# Patient Record
Sex: FEMALE | Race: WHITE
[De-identification: names, ages, dates, MRNs, and addresses within clinical notes are randomized per-mention and may not be internally consistent; named-entity substitution may affect disease eponyms.]

---

## 2018-02-27 ENCOUNTER — HOSPITAL ENCOUNTER (OUTPATIENT)
Dept: HOSPITAL 76 - LAB | Age: 28
Discharge: HOME | End: 2018-02-27
Attending: OBSTETRICS & GYNECOLOGY
Payer: COMMERCIAL

## 2018-02-27 DIAGNOSIS — Z36.9: Primary | ICD-10-CM

## 2018-02-27 LAB
BASOPHILS NFR BLD AUTO: 0.1 10^3/UL (ref 0–0.1)
BASOPHILS NFR BLD AUTO: 0.5 %
CLARITY UR REFRACT.AUTO: CLEAR
EOSINOPHIL # BLD AUTO: 0.2 10^3/UL (ref 0–0.7)
EOSINOPHIL NFR BLD AUTO: 1.5 %
ERYTHROCYTE [DISTWIDTH] IN BLOOD BY AUTOMATED COUNT: 14.5 % (ref 12–15)
GLUCOSE UR QL STRIP.AUTO: NEGATIVE MG/DL
HGB UR QL STRIP: 12.7 G/DL (ref 12–16)
KETONES UR QL STRIP.AUTO: NEGATIVE MG/DL
LYMPHOCYTES # SPEC AUTO: 1.5 10^3/UL (ref 1.5–3.5)
LYMPHOCYTES NFR BLD AUTO: 14.6 %
MCH RBC QN AUTO: 32.6 PG (ref 27–31)
MCHC RBC AUTO-ENTMCNC: 35.2 G/DL (ref 32–36)
MCV RBC AUTO: 92.6 FL (ref 81–99)
MONOCYTES # BLD AUTO: 0.6 10^3/UL (ref 0–1)
MONOCYTES NFR BLD AUTO: 6.1 %
NEUTROPHILS # BLD AUTO: 8.1 10^3/UL (ref 1.5–6.6)
NEUTROPHILS # SNV AUTO: 10.5 X10^3/UL (ref 4.8–10.8)
NEUTROPHILS NFR BLD AUTO: 77.3 %
NITRITE UR QL STRIP.AUTO: NEGATIVE
PDW BLD AUTO: 7.7 FL (ref 7.9–10.8)
PH UR STRIP.AUTO: 8 PH (ref 5–7.5)
PLATELET # BLD: 279 10^3/UL (ref 130–450)
PROT UR STRIP.AUTO-MCNC: NEGATIVE MG/DL
RBC # UR STRIP.AUTO: NEGATIVE /UL
RBC # URNS HPF: (no result) /HPF (ref 0–5)
RBC MAR: 3.91 10^6/UL (ref 4.2–5.4)
SP GR UR STRIP.AUTO: 1.01 (ref 1–1.03)
SQUAMOUS URNS QL MICRO: (no result)
UROBILINOGEN UR QL STRIP.AUTO: (no result) E.U./DL
UROBILINOGEN UR STRIP.AUTO-MCNC: NEGATIVE MG/DL

## 2018-02-27 PROCEDURE — 86803 HEPATITIS C AB TEST: CPT

## 2018-02-27 PROCEDURE — 87340 HEPATITIS B SURFACE AG IA: CPT

## 2018-02-27 PROCEDURE — 87389 HIV-1 AG W/HIV-1&-2 AB AG IA: CPT

## 2018-02-27 PROCEDURE — 86901 BLOOD TYPING SEROLOGIC RH(D): CPT

## 2018-02-27 PROCEDURE — 81001 URINALYSIS AUTO W/SCOPE: CPT

## 2018-02-27 PROCEDURE — 85025 COMPLETE CBC W/AUTO DIFF WBC: CPT

## 2018-02-27 PROCEDURE — 86850 RBC ANTIBODY SCREEN: CPT

## 2018-02-27 PROCEDURE — 36415 COLL VENOUS BLD VENIPUNCTURE: CPT

## 2018-02-27 PROCEDURE — 86900 BLOOD TYPING SEROLOGIC ABO: CPT

## 2018-02-27 PROCEDURE — 86762 RUBELLA ANTIBODY: CPT

## 2018-02-27 PROCEDURE — 86592 SYPHILIS TEST NON-TREP QUAL: CPT

## 2018-02-27 PROCEDURE — 81599 UNLISTED MAAA: CPT

## 2018-02-28 LAB
HEPATITIS B SURFACE ANTIGEN: (no result)
HEPATITIS C ANTIBODY: (no result)
HIV AG/AB 4TH GEN: (no result)
SIGNAL TO CUT-OFF: 0 (ref ?–1)

## 2018-03-01 ENCOUNTER — HOSPITAL ENCOUNTER (OUTPATIENT)
Dept: HOSPITAL 76 - LAB.R | Age: 28
Discharge: HOME | End: 2018-03-01
Attending: OBSTETRICS & GYNECOLOGY
Payer: COMMERCIAL

## 2018-03-01 DIAGNOSIS — B37.3: Primary | ICD-10-CM

## 2018-03-01 PROCEDURE — 87660 TRICHOMONAS VAGIN DIR PROBE: CPT

## 2018-03-01 PROCEDURE — 87510 GARDNER VAG DNA DIR PROBE: CPT

## 2018-03-01 PROCEDURE — 87480 CANDIDA DNA DIR PROBE: CPT

## 2018-04-16 ENCOUNTER — HOSPITAL ENCOUNTER (OUTPATIENT)
Dept: HOSPITAL 76 - DI | Age: 28
Discharge: HOME | End: 2018-04-16
Attending: OBSTETRICS & GYNECOLOGY
Payer: COMMERCIAL

## 2018-04-16 DIAGNOSIS — Z36.9: Primary | ICD-10-CM

## 2018-04-16 PROCEDURE — 76811 OB US DETAILED SNGL FETUS: CPT

## 2018-04-16 NOTE — ULTRASOUND REPORT
OB ULTRASOUND:  04/16/2018

 

CLINICAL INDICATION:  Fetal anatomy.

 

TECHNIQUE:  Real-time scanning was performed with representative static images 
obtained. 







LAST MENSTRUAL PERIOD:  11/27/2017

 

Clinical Age:  20 weeks 0 days

 

US Age:  20 weeks 4 days

 

EFW Hadlock:  383 grams

 

EFW% Hadlock:  88%

 

Fetal Heart Rate:  150 bpm

 

EDC:  09/03/2018

 

US EDC:  08/30/2018

 

BPD Hadlock:  19 weeks 6 days; Mean mm 46

 

HC Hadlock:  20 weeks 4 days; Mean mm 182

 

AC Hadlock:  20 weeks 5 days; Mean mm 156

 

FL Hadlock:  21 weeks 1 day; Mean mm 35

 

Fetal Presentation: breech

 

Placental Location: posterior

 

Cervical Length: TA 4.8 cm

 

Amniotic Fluid: subjectively normal; MVP 4.8 cm





FINDINGS

There is a single viable intrauterine gestation, in breech presentation.  

Fetal heart rate is 150 BPM.  The placenta is posterior, without evidence of 
previa.

Amniotic fluid volume is subjectively normal, with a deepest pocket of 4.8 cm.

By size, the fetus measures 20 weeks 4 days (20 weeks 0 days by LMP).

 

The following anatomic structures were visualized and appear normal:

The intracranial contents, including the ventricles and posterior fossa; the 
fetal lips and orbits; 

the fetal spine; the fetal heart, including 4 chamber view and outflow tracts, 
and fetal diaphragm; 

the fetal abdominal contents, including the stomach, the bilateral kidneys, and 
urinary bladder, as well as 

a normal 3 vessel cord insertion; 4 fetal limbs.

 

No free fluid or adnexal lesion is appreciated.

 

IMPRESSION:  SINGLE VIABLE INTRAUTERINE GESTATION, WITH SIZE IN KEEPING WITH 
LMP DATING.  

NORMAL FETAL ANATOMIC SURVEY.

 

 

 

DD: 04/16/2018 14:23

TD: 04/16/2018 14:45

Job #: [id number]

[facility]

## 2018-05-17 ENCOUNTER — HOSPITAL ENCOUNTER (OUTPATIENT)
Dept: HOSPITAL 76 - LAB | Age: 28
Discharge: HOME | End: 2018-05-17
Attending: OBSTETRICS & GYNECOLOGY
Payer: COMMERCIAL

## 2018-05-17 DIAGNOSIS — Z34.90: Primary | ICD-10-CM

## 2018-05-17 LAB
BASOPHILS NFR BLD AUTO: 0 10^3/UL (ref 0–0.1)
BASOPHILS NFR BLD AUTO: 0.3 %
EOSINOPHIL # BLD AUTO: 0.1 10^3/UL (ref 0–0.7)
EOSINOPHIL NFR BLD AUTO: 0.9 %
ERYTHROCYTE [DISTWIDTH] IN BLOOD BY AUTOMATED COUNT: 14.3 % (ref 12–15)
HGB UR QL STRIP: 12.1 G/DL (ref 12–16)
LYMPHOCYTES # SPEC AUTO: 1.4 10^3/UL (ref 1.5–3.5)
LYMPHOCYTES NFR BLD AUTO: 13.8 %
MCH RBC QN AUTO: 32.8 PG (ref 27–31)
MCHC RBC AUTO-ENTMCNC: 34.9 G/DL (ref 32–36)
MCV RBC AUTO: 93.8 FL (ref 81–99)
MONOCYTES # BLD AUTO: 0.5 10^3/UL (ref 0–1)
MONOCYTES NFR BLD AUTO: 5.1 %
NEUTROPHILS # BLD AUTO: 8.2 10^3/UL (ref 1.5–6.6)
NEUTROPHILS # SNV AUTO: 10.3 X10^3/UL (ref 4.8–10.8)
NEUTROPHILS NFR BLD AUTO: 79.9 %
PDW BLD AUTO: 7.4 FL (ref 7.9–10.8)
PLATELET # BLD: 223 10^3/UL (ref 130–450)
RBC MAR: 3.7 10^6/UL (ref 4.2–5.4)

## 2018-05-17 PROCEDURE — 86850 RBC ANTIBODY SCREEN: CPT

## 2018-05-17 PROCEDURE — 82950 GLUCOSE TEST: CPT

## 2018-05-17 PROCEDURE — 36415 COLL VENOUS BLD VENIPUNCTURE: CPT

## 2018-05-17 PROCEDURE — 85025 COMPLETE CBC W/AUTO DIFF WBC: CPT

## 2018-06-01 ENCOUNTER — HOSPITAL ENCOUNTER (OUTPATIENT)
Dept: HOSPITAL 76 - LAB | Age: 28
Discharge: HOME | End: 2018-06-01
Attending: OBSTETRICS & GYNECOLOGY
Payer: COMMERCIAL

## 2018-06-01 DIAGNOSIS — Z36.9: Primary | ICD-10-CM

## 2018-06-01 PROCEDURE — 82951 GLUCOSE TOLERANCE TEST (GTT): CPT

## 2018-06-01 PROCEDURE — 36415 COLL VENOUS BLD VENIPUNCTURE: CPT

## 2018-06-01 PROCEDURE — 82952 GTT-ADDED SAMPLES: CPT

## 2018-08-09 ENCOUNTER — HOSPITAL ENCOUNTER (OUTPATIENT)
Dept: HOSPITAL 76 - LAB.R | Age: 28
Discharge: HOME | End: 2018-08-09
Attending: OBSTETRICS & GYNECOLOGY
Payer: COMMERCIAL

## 2018-08-09 DIAGNOSIS — Z36.85: Primary | ICD-10-CM

## 2018-08-09 PROCEDURE — 87081 CULTURE SCREEN ONLY: CPT

## 2018-09-05 ENCOUNTER — HOSPITAL ENCOUNTER (INPATIENT)
Dept: HOSPITAL 76 - WFO | Age: 28
LOS: 2 days | Discharge: HOME | End: 2018-09-07
Attending: OBSTETRICS & GYNECOLOGY | Admitting: OBSTETRICS & GYNECOLOGY
Payer: COMMERCIAL

## 2018-09-05 DIAGNOSIS — Z3A.40: ICD-10-CM

## 2018-09-05 LAB
BASOPHILS NFR BLD AUTO: 0 10^3/UL (ref 0–0.1)
BASOPHILS NFR BLD AUTO: 0.3 %
EOSINOPHIL # BLD AUTO: 0.1 10^3/UL (ref 0–0.7)
EOSINOPHIL NFR BLD AUTO: 0.9 %
ERYTHROCYTE [DISTWIDTH] IN BLOOD BY AUTOMATED COUNT: 14.6 % (ref 12–15)
HGB UR QL STRIP: 12.6 G/DL (ref 12–16)
LYMPHOCYTES # SPEC AUTO: 2 10^3/UL (ref 1.5–3.5)
LYMPHOCYTES NFR BLD AUTO: 14.3 %
MCH RBC QN AUTO: 31.9 PG (ref 27–31)
MCHC RBC AUTO-ENTMCNC: 34.8 G/DL (ref 32–36)
MCV RBC AUTO: 91.8 FL (ref 81–99)
MONOCYTES # BLD AUTO: 0.8 10^3/UL (ref 0–1)
MONOCYTES NFR BLD AUTO: 5.9 %
NEUTROPHILS # BLD AUTO: 11.1 10^3/UL (ref 1.5–6.6)
NEUTROPHILS # SNV AUTO: 14.1 X10^3/UL (ref 4.8–10.8)
NEUTROPHILS NFR BLD AUTO: 78.6 %
PDW BLD AUTO: 7.8 FL (ref 7.9–10.8)
PLATELET # BLD: 222 10^3/UL (ref 130–450)
RBC MAR: 3.93 10^6/UL (ref 4.2–5.4)

## 2018-09-05 PROCEDURE — 99213 OFFICE O/P EST LOW 20 MIN: CPT

## 2018-09-05 PROCEDURE — 85025 COMPLETE CBC W/AUTO DIFF WBC: CPT

## 2018-09-05 RX ADMIN — DOCUSATE SODIUM SCH MG: 100 CAPSULE, LIQUID FILLED ORAL at 22:04

## 2018-09-05 RX ADMIN — SODIUM CHLORIDE, PRESERVATIVE FREE SCH ML: 5 INJECTION INTRAVENOUS at 09:37

## 2018-09-05 RX ADMIN — SODIUM CHLORIDE, PRESERVATIVE FREE SCH ML: 5 INJECTION INTRAVENOUS at 14:12

## 2018-09-05 RX ADMIN — PENICILLIN G POTASSIUM SCH MLS/HR: 5000000 POWDER, FOR SOLUTION INTRAMUSCULAR; INTRAPLEURAL; INTRATHECAL; INTRAVENOUS at 14:13

## 2018-09-05 RX ADMIN — PENICILLIN G POTASSIUM SCH MLS/HR: 5000000 POWDER, FOR SOLUTION INTRAMUSCULAR; INTRAPLEURAL; INTRATHECAL; INTRAVENOUS at 09:37

## 2018-09-05 RX ADMIN — IBUPROFEN SCH MG: 600 TABLET, FILM COATED ORAL at 20:53

## 2018-09-05 NOTE — PROVIDER PROGRESS NOTE
Objective





- Vital Signs/Intake & Output


Vital Signs: 


 Vital Signs x48h











  Temp Pulse Resp BP Pulse Ox


 


 18 02:29  98.6 F  88  18  122/74  100











Intake & Output: 


 Intake & Output











 18





 23:59 23:59 23:59 23:59


 


Intake Total    350


 


Output Total    425


 


Balance    -75














- Lab Results


Fish Bones: 


 18 04:20





Other Labs: 


 Lab Results x24hrs











  18 Range/Units





  04:20 


 


WBC  14.1 H  (4.8-10.8)  x10^3/uL


 


RBC  3.93 L  (4.20-5.40)  10^6/uL


 


Hgb  12.6  (12.0-16.0)  g/dL


 


Hct  36.1 L  (37.0-47.0)  %


 


MCV  91.8  (81.0-99.0)  fL


 


MCH  31.9 H  (27.0-31.0)  pg


 


MCHC  34.8  (32.0-36.0)  g/dL


 


RDW  14.6  (12.0-15.0)  %


 


Plt Count  222  (130-450)  10^3/uL


 


MPV  7.8 L  (7.9-10.8)  fL


 


Neut # (Auto)  11.1 H  (1.5-6.6)  10^3/uL


 


Lymph # (Auto)  2.0  (1.5-3.5)  10^3/uL


 


Mono # (Auto)  0.8  (0.0-1.0)  10^3/uL


 


Eos # (Auto)  0.1  (0.0-0.7)  10^3/uL


 


Baso # (Auto)  0.0  (0.0-0.1)  10^3/uL


 


Absolute Nucleated RBC  0.00  x10^3/uL


 


Nucleated RBC %  0.0  /100WBC














Assessment/Plan





- Problem List


(1) Supervision of normal first pregnancy in third trimester


Impression: 


H&P dictated # [id number]


29yo G1 at 40w2d by LMP but is likely a few days older based on 8w US dating.  

In active spontaneous labor.  GBS +, NKDA, s/p 1st dose of PCN.  Coping well, 

good SVE change, Cat 1 NST, Anticipate .

## 2018-09-05 NOTE — HISTORY & PHYSICAL EXAMINATION
DATE OF SERVICE: 09/05/2018

Physician: Kindra Dove MD

 

CHIEF COMPLAINT:  Labor.

 

HISTORY OF PRESENT ILLNESS:  The patient had contractions starting yesterday on 09/04/2018 in the mor
kimmy, that were a bit more than usual.  At around 6 p.m., it began to feel like real labor.  She came
 in around midnight, where she was found to be 4 cm dilated, and at that point she was admitted.  She
 has not had any vaginal bleeding or leaking of water.

 

PAST MEDICAL HISTORY:  Negative.

 

PAST SURGICAL HISTORY:  Adenoids and wisdom teeth extractions.

 

SOCIAL HISTORY:  No tobacco, alcohol or drug use.  The patient is a pediatric RN.  Her  is in 
the [employer], and they have good family support coming in to town.

 

FAMILY HISTORY:  No anesthesia complications.

 

ALLERGIES:  NO KNOWN DRUG ALLERGIES.

 

MEDICATIONS:  Prenatal vitamins.

 

OBSTETRICAL HISTORY:  The patient is a G1.  Her ANKUSH throughout her pregnancy has been 09/03/2018 by a
n LMP however, she did have an 8-week ultrasound that had an ANKUSH of 08/28/2018, and she is likely johnny
ser to this gestational age.  She has not had any problems during her pregnancy other than being grou
p B strep positive.  She received her Tdap in 06/2018.  She has had normal prenatal labs including bl
ood type O positive, antibody negative.  STD screening was negative including gonorrhea, chlamydia, h
epatitis, HIV, RPR nonreactive.  She is rubella immune, and her last hematocrit was 34.7.  She did ha
ve an elevated 1-hour GTT, but her 1-hour GTT was normal.  Her 1-hour result was 147.

 

PHYSICAL EXAMINATION 

VITAL SIGNS:   She has been afebrile, with normal vital signs throughout her hospitalization.  Blood 
pressure 126/69, heart rate normal, temperature 36.8.

GENERAL:  She is alert and pleasant.  Is up and out of bed and moving around with contractions.  Othe
rwise, she is in no apparent distress.

ABDOMEN:  Soft, nontender and gravid, with an estimated fetal weight of 8.25 pounds. 

PELVIC:  Cervical exam at 8:20 was 7 cm, 100% effaced, and 0 station.  She has got a category 1 NST, 
and contractions are q. 2 minutes.  They palpate strong.

 

ASSESSMENT:  28-year-old G1, at 40 weeks and 2 days by an LMP, but likely a few days later than that,
 with active spontaneous labor:

1.  She is group B strep positive and has received one dose of penicillin, and is due for one more.

2.  Her fetal wellbeing is reassuring.

3.  Her cervical progression is excellent, and spontaneous vaginal delivery is anticipated.

4.  No potential postpartum problems identified.

 

 

DD: 09/05/2018 10:00

TD: 09/05/2018 10:09

Job #: [id number]

## 2018-09-06 RX ADMIN — IBUPROFEN SCH MG: 600 TABLET, FILM COATED ORAL at 11:07

## 2018-09-06 RX ADMIN — IBUPROFEN SCH MG: 600 TABLET, FILM COATED ORAL at 03:54

## 2018-09-06 RX ADMIN — DOCUSATE SODIUM SCH MG: 100 CAPSULE, LIQUID FILLED ORAL at 21:02

## 2018-09-06 RX ADMIN — DOCUSATE SODIUM SCH MG: 100 CAPSULE, LIQUID FILLED ORAL at 08:50

## 2018-09-06 RX ADMIN — IBUPROFEN SCH MG: 600 TABLET, FILM COATED ORAL at 18:00

## 2018-09-06 NOTE — PROCEDURE REPORT
DATE OF SERVICE: 09/05/2018

Physician: Kindra Dove MD

 

PREDELIVERY DIAGNOSES

   1. Intrauterine pregnancy at 40 weeks.

   2. Spontaneous active labor.

   3. Group B strep positive.  

 

POSTDELIVERY DIAGNOSIS:  Status post normal spontaneous vaginal delivery.

 

DELIVERY TYPE:  Normal spontaneous vaginal delivery with episiotomy.

 

DELIVERING PHYSICIAN:  Kindra Dove MD

 

ANESTHESIA:   Brief use of nitrox at the end of her second stage.

 

FINDINGS  

   1. Liveborn female with normal Apgars and AGA weight.

   2. Terminal meconium-stained fluid.

   3. Second-degree, mediolateral, left episiotomy.

 

LABOR COURSE:  The patient is a 22-year-old G1 who was admitted in active spontaneous labor at 40 wee
ks and 2 days.  She chose an unmedicated experience, and she progressed nicely to complete without th
e need for augmentation.  She had a nice category 1 tracing throughout her labor.  She was group B st
rep positive and did get more than 2 doses of antibiotics.  She had an increasing urge to push and wa
s complete.

 

DELIVERY COURSE:  Certified nurse midwife, Nery, assisted me while I was in with another birth, and s
he was with the patient during the first portion of her pushing phase.  During this time, the fetal h
eart tracing became tachycardic in its baseline, to the 160s with moderate long-term variability and 
accelerations preserved.  There were no decelerations.  Maternal heart rate was also elevated.  [place]
rature was normal.  There was no evidence of chorioamnionitis at birth.  I suspect that mom was dehyd
rated and working very hard, causing the tachycardia.  She did receive an IV fluid bolus for this as 
well as oxygen.  She was pushing very effectively but having a problem progressing past the caput.  S
he began becoming more and more tired and began wondering what would happen if she was not able to pu
sh the baby out.  She was offered a trial of the nitrox to see if it would help her to possibly relax
 and to facilitate birth.  It did help to improve her experience.  She was able to push strongly whil
e on the nitrox, but it was not effective in helping her to deliver.  She was then counseled that we 
could continue pushing.  I could offer her episiotomy or an operative vaginal delivery.  As the stati
on was still low, I did not encourage operative delivery but, instead, encouraged episiotomy.  We did
 discuss the risk of extension of the episiotomy, possibly into the anus.  The patient verbally conse
nted to episiotomy, as she felt like she was not going to be able to sustain energy much longer to pu
sh her baby out.  Lidocaine 1% without epinephrine was used to numb the perineum midline to left and 
then the episiotomy was incised.  The patient delivered about 3 contractions later.  Presentation was
 OA.  There was no nuchal cord.  Shoulders and body were easily delivered.  The baby was placed on mo
m's abdomen for warm, dry, and stimulate.  The umbilical cord was left pulsating for 10 minutes and i
t did not stop, so, at that point, the umbilical cord was clamped x2 by me and cut by the father of t
he baby.  Cord blood was obtained for typing and the cord was drained.  A small dose of Pitocin was g
iven IV because the patient was bleeding a bit.  Her placenta came out a few minutes later, intact wi
th a 3-vessel cord, followed by a gush of blood that stopped flowing after continuous fundal massage.
  Currently, her fundus is firm and 1 cm below the umbilicus.  Her episiotomy was then repaired with 
deeper sutures of 2-0 Vicryl and then more superficial ones of 4-0 Vicryl until normal anatomy was re
stored.

  

POSTPARTUM PLAN:  We reviewed routine postpartum care and no postpartum risk factors are present.  Wi
th the tachycardia, we will have to make sure she does not develop endomyometritis but, again, I do n
ot think she has chorioamnionitis.

 

 

DD: 09/05/2018 18:27

TD: 09/05/2018 18:38

Job #: [id number]

## 2018-09-07 VITALS — SYSTOLIC BLOOD PRESSURE: 116 MMHG | DIASTOLIC BLOOD PRESSURE: 70 MMHG

## 2018-09-07 RX ADMIN — IBUPROFEN SCH MG: 600 TABLET, FILM COATED ORAL at 00:28

## 2018-09-07 RX ADMIN — IBUPROFEN SCH MG: 600 TABLET, FILM COATED ORAL at 12:11

## 2018-09-07 RX ADMIN — DOCUSATE SODIUM SCH MG: 100 CAPSULE, LIQUID FILLED ORAL at 08:45

## 2018-09-07 RX ADMIN — IBUPROFEN SCH MG: 600 TABLET, FILM COATED ORAL at 06:31

## 2018-09-07 NOTE — DISCHARGE SUMMARY
Physician: Kindra Dove MD

DATE OF ADMISSION: 2018

DATE OF DISCHARGE:  2018

 

ADMISSION DIAGNOSES 

1.  Intrauterine pregnancy at 40 weeks.

2.  Active spontaneous labor.

3.  Group B strep positive.

 

DISCHARGE DIAGNOSIS:  Status post normal spontaneous delivery, uncomplicated.

 

OPERATIONS AND PROCEDURES:  2018, normal spontaneous vaginal delivery.

 

HOSPITAL COURSE:  The patient was admitted in spontaneous labor, and she had a 
burst, where an episiotomy was required, as she was  for quite some 
time.  Otherwise, everything was uncomplicated.  

 

By postpartum day 2, she was eating, ambulating, and urinating without 
difficulties.  She was breastfeeding well and had no concerns.  Her mood was 
good, and she did not have any heavy bleeding.

 

DISCHARGE EXAMINATION:  Afebrile with normal vital signs.  She is alert and 
pleasant, in no apparent distress.  She is smiling.  Abdomen soft, nontender, 
nondistended.  Fundus firm and 1 cm below the umbilicus.

 

DISCHARGE CONDITION:  Good.

 

DISCHARGE DISPOSITION:  Home.

 

MEDICATIONS:  Prenatal vitamins daily, as well as ibuprofen and Colace p.r.n.

 

FOLLOWUP:  In 3 weeks at [facility].

 

OUTSTANDING LABS:  None.  

 

 

DD: 2018 10:38

TD: 2018 10:44

Job #: [id number]

[facility]

## 2018-09-07 NOTE — DISCHARGE PLAN
Discharge Plan


Disposition: 01 Home, Self Care


Condition: Good


Prescriptions: 


Docusate Sodium [Dulcolax Stool Softener] 100 mg PO BID PRN #1 capsule


 PRN Reason: Constipation


Diet: Regular


Activity Restrictions: No Restrictions


Shower Restrictions: No


Driving Restrictions: No


Weight Bearing: Full Weight


Additional Instructions or Follow Up instructions: 


See written instructions from L&D


No Smoking: If you smoke, Please STOP!  Call [phone] for help.


Follow-up with: 


Neelam Stauffer DO [Provider Admit Priv/Credential] -

## 2018-09-07 NOTE — LABOR FLOWSHEET
===================================

Labor Flowsheet

===================================

Datetime Report Generated by CPN: 09/07/2018 18:17

   

   

===========================

Datetime: 09/07/2018 15:09

===========================

   

   

===================================

VITAL SIGNS

===================================

   

 NBP Sys/Claudette/Mean (mmHg):  116

:  70

:  81

 Pulse:  83

 LaborFlag:  Labor

   

===========================

Datetime: 09/06/2018 00:39

===========================

   

 SpO2 (%):  97

   

===========================

Datetime: 09/05/2018 17:18

===========================

   

   

===================================

UTERINE ACTIVITY

===================================

   

 Monitor Mode:  External

 Frequency (min):  2-4

 Quality:  Strong

 Duration (sec):  30-60

 Pattern:  Normal: <= 5 Contractions in 10 Minutes

 Resting Tone (Palpate):  Relaxed

 FHR Baseline Rate :  170

 Variability:  Minimal - Undetectable to <=5 bpm

 Comments:  FHT 170s between ctx. unable to assess strip with pushing,

 Patient Care Comments:  decision made for epiostomy, pt numbed with lidocaine prior and was breathin
g in nitrous and tolerated well

   

===========================

Datetime: 09/05/2018 17:01

===========================

   

 Temperature (C):  36.8

   

===================================

FETAL ASSESSMENT A

===================================

   

 Monitor Mode:  External US

 Accelerations:  None

 Oxygen Amount (LPM):  10

 Oxygen Method:  Non-Rebreather

   

===========================

Datetime: 09/05/2018 16:31

===========================

   

 Decelerations:  Variable

   

===========================

Datetime: 09/05/2018 16:01

===========================

   

 Pushing Position:  Pushing Right Side

   

===========================

Datetime: 09/05/2018 15:29

===========================

   

 Respirations:  18

 Category:  Category II

   

===================================

STAGE 2

===================================

   

 Pushing:  Coached on Pushing; Urge to Push

 Stage 2 Comments:  hands and knees pushing

   

===========================

Datetime: 09/05/2018 15:07

===========================

   

 Pushing Progress:  Descent with Pushing

   

===========================

Datetime: 09/05/2018 15:05

===========================

   

   

===================================

VAGINAL EXAM

===================================

   

 Dilatation (cm):  10.0

 Effacement (%):  100

 Station:  1

 Exam by:  rory danilo cnm

 Vaginal Exam Comments:  pt pushed through anterior lip

   

===========================

Datetime: 09/05/2018 15:01

===========================

   

 FHR Baseline Changes:  No Baseline Change

   

===========================

Datetime: 09/05/2018 14:57

===========================

   

   

===================================

COMMUNICATION

===================================

   

 Communication:  Provider at Bedside

 Communication Comments:  rory danilo at , due to dr fulton in an emergency. 

   

===========================

Datetime: 09/05/2018 14:30

===========================

   

 Pain Coping:  Breathing Through Contractions

 Pain Assessment Comments:  strong urge to push with each ctx

 Patient Position/Activity:  Hands-Knees

 Comfort Measures:  Coaching; Back Rub Given

   

===========================

Datetime: 09/05/2018 14:16

===========================

   

   

===================================

MEDICATIONS

===================================

   

 Antibiotics:  Penicillin IV (Units) @ 2.5

   

===========================

Datetime: 09/05/2018 13:41

===========================

   

 Pain Location:  Abdomen; Back

   

===========================

Datetime: 09/05/2018 13:37

===========================

   

 I/O Interventions:  Up to BR

   

===========================

Datetime: 09/05/2018 12:40

===========================

   

 Membrane Status:  Ruptured

 Membranes Rupture Method:  Artificial

 Amniotic Fluid Color:  Clear

 Amniotic Fluid Amount:  Small

 Amniotic Fluid Odor:  Normal

   

===========================

Datetime: 09/05/2018 11:02

===========================

   

   

===================================

PAIN

===================================

   

 Pain Scale:  8

   

===========================

Datetime: 09/05/2018 10:33

===========================

   

 Pain Presence:  Intermittent

   

===========================

Datetime: 09/05/2018 10:30

===========================

   

 Monitor Interventions for UA:  [place] Adjusted

 Monitor Interventions for FHR:  Ultrasound Adjusted

   

===========================

Datetime: 09/05/2018 08:20

===========================

   

 Vaginal Bleeding:  None

 Cervix, Consistency:  Soft

 Cervix, Position:  Posterior

   

===========================

Datetime: 09/05/2018 06:45

===========================

   

Stage of Pregnancy:  Labor

 Pain Type:  Cramping; Contraction; Stabbing; Ache

 Pain Goal:  9

 Pain Relief Measures:  Comfort Measures

   

===========================

Datetime: 09/05/2018 06:30

===========================

   

 Actions for Fetal Decelerations:  Side to Side

   

===========================

Datetime: 09/05/2018 04:20

===========================

   

   

===================================

PATIENT CARE

===================================

   

 IV/Blood Work:  IV Started; IV Bolus Started; Labs Drawn with IV Start

## 2018-09-15 ENCOUNTER — HOSPITAL ENCOUNTER (EMERGENCY)
Dept: HOSPITAL 76 - ED | Age: 28
Discharge: HOME | End: 2018-09-15
Payer: COMMERCIAL

## 2018-09-15 VITALS — DIASTOLIC BLOOD PRESSURE: 86 MMHG | SYSTOLIC BLOOD PRESSURE: 131 MMHG

## 2018-09-15 DIAGNOSIS — L05.01: ICD-10-CM

## 2018-09-15 DIAGNOSIS — O99.89: Primary | ICD-10-CM

## 2018-09-15 PROCEDURE — 10080 I&D PILONIDAL CYST SIMPLE: CPT

## 2018-09-15 PROCEDURE — 99283 EMERGENCY DEPT VISIT LOW MDM: CPT

## 2018-09-15 NOTE — ED PHYSICIAN DOCUMENTATION
PD HPI SKIN





- Stated complaint


Stated Complaint: TAILBONE PX





- Chief complaint


Chief Complaint: Ext Problem





- History obtained from


History obtained from: Patient





- History of Present Illness


Timing - onset: How many days ago (3)


Timing - details: Gradual onset, Still present


Location: Back


Quality / character: Painful, Discolored


Similar symptoms before: Has not had sx before


Recently seen: Not recently seen





- Additional information


Additional information: 





Patient is a 28 year old female 2 weeks  after delivery for pain, swelling 

around her tailbone.  Patient states that it has been going on for the last 

three days and has become progressively worse.  





Review of Systems


Ten Systems: 10 systems reviewed and negative


Constitutional: denies: Fever, Chills


Skin: reports: Lesions





PD PAST MEDICAL HISTORY





- Present Medications


Home Medications: 


                                Ambulatory Orders











 Medication  Instructions  Recorded  Confirmed


 


Docusate Sodium [Dulcolax Stool 100 mg PO BID PRN #1 capsule 09/07/18 





Softener]   


 


Ibuprofen [Motrin] 600 mg PO Q6H  tablet 09/07/18 


 


Cephalexin [Keflex] 500 mg PO Q6H 7 Days  capsule 09/15/18 














- Allergies


Allergies/Adverse Reactions: 


                                    Allergies











Allergy/AdvReac Type Severity Reaction Status Date / Time


 


No Known Drug Allergies Allergy   Verified 09/15/18 18:12














- Social History


Smoking Status: Never smoker





PD ED PE NORMAL





- Vitals


Vital signs reviewed: Yes





- General


General: Alert and oriented X 3





- HEENT


HEENT: Atraumatic





- Cardiac


Cardiac: RRR





- Respiratory


Respiratory: No respiratory distress





- Abdomen


Abdomen: Soft





- Neuro


Neuro: Alert and oriented X 3


Eye Opening: Spontaneous





PD ED PE EXPANDED





- Derm


Derm: Abscess (pionidal cyst/abscess)





Results





- Vitals


Vitals: 





                               Vital Signs - 24 hr











  09/15/18





  18:10


 


Temperature 36.8 C


 


Heart Rate 88


 


Respiratory 18





Rate 


 


Blood Pressure 131/86 H


 


O2 Saturation 99








                                     Oxygen











O2 Source                      Room air

















Procedures





- Abscess I&D (location)


  ** gluteal fold


Preparation: Alcohol, Lidocaine 1%


Incision: Incised with scalpel, Purulent drainage, Loculations broken


Other: Pt tolerated well, Antibiotic prescribed





PD MEDICAL DECISION MAKING





- ED course


Complexity details: reviewed old records, re-evaluated patient, considered 

differential, d/w patient, d/w family


ED course: 





Patient was seen and examined at bedside.  Patient's abscess was i/d as 

described above.  Patient was started on keflex.  Patient required no further 

work up and was stable for discharge with outpatient follow up.  





- Sepsis Event


Vital Signs: 





                               Vital Signs - 24 hr











  09/15/18





  18:10


 


Temperature 36.8 C


 


Heart Rate 88


 


Respiratory 18





Rate 


 


Blood Pressure 131/86 H


 


O2 Saturation 99








                                     Oxygen











O2 Source                      Room air

















Departure





- Departure


Disposition: 01 Home, Self Care


Clinical Impression: 


 Cyst, pilonidal, with abscess





Condition: Good


Instructions:  ED Abscess IandD


Follow-Up: 


primary,are provider [Other] - As Needed


Prescriptions: 


Cephalexin [Keflex] 500 mg PO Q6H 7 Days  capsule


Comments: 


Your symptoms today are being caused by an abscess which has been drained.  You 

should keep the area clean and dry and take the full course of antibiotics.  you

 should take it with yogurt or probiotics to help reduce the gi side effects.  

you can take tylenol as needed for pain.  You can return to the emergency 

department at any time for new, worsening or uncontrollable symptoms.

## 2019-10-04 ENCOUNTER — HOSPITAL ENCOUNTER (OUTPATIENT)
Dept: HOSPITAL 76 - DI | Age: 29
Discharge: HOME | End: 2019-10-04
Attending: ADVANCED PRACTICE MIDWIFE
Payer: COMMERCIAL

## 2019-10-04 DIAGNOSIS — Z32.01: Primary | ICD-10-CM

## 2019-10-04 PROCEDURE — 76801 OB US < 14 WKS SINGLE FETUS: CPT

## 2019-10-04 NOTE — ULTRASOUND REPORT
Reason:  PREGNANCY TEST POSITIVE

Procedure Date:  10/04/2019   

Accession Number:  [id number] / [id number]                    

Procedure:  US  - OB First Trimester CPT Code:  

 

FULL RESULT:

 

 

EXAM:

FIRST TRIMESTER OBSTETRIC ULTRASOUND

(Less than 11 weeks)

 

EXAM DATE: 10/4/2019 10:23 AM.

 

CLINICAL HISTORY: PREGNANCY TEST POSITIVE.

LMP: Unknown.

 

COMPARISONS: None.

 

TECHNIQUE: Transabdominal ultrasound examination with static image 

documentation.

 

CLINICAL DATES:

EGA 11 weeks 3 days with ANKUSH 04/21/2020 based on LMP.

 

ASSESSMENT:

Gestational Sac: Single intrauterine.  Mean gestational sac diameter: 

55.2 mm = 11 weeks 3 days.

Embryo: CRL (crown-rump length) 47.8 mm = 11 weeks 4 days.

Cardiac activity: 172 beats per minute.

Yolk sac: 6 mm.

Amniotic fluid: Not accurately assessed at this gestational age.

Early placenta: Anterior.

Other: No perigestational fluid collection demonstrated.

 

MATERNAL STRUCTURES:

Uterus: Anteverted/Retroverted. There is a posterior intramural mass, 

presumably a fibroid, measuring 5.8 x 3.3 x 4.2 cm.

Cervix: Closed.

Right Ovary/Adnexa: The ovary measures 3.5 x 1.3 x 1.4 cm, volume 3.3 cc. 

Unremarkable.

Left Ovary/Adnexa: The left ovary is not visualized.

Free Fluid: None.

 

Other: None.

IMPRESSION:

1. Single viable intrauterine pregnancy at EGA 11 weeks 4 days with ANKUSH 

04/20/2020 based on crown-rump length, which is concordant with clinical 

dates.

2. Assigned dating is ANKUSH 11 weeks 3 days based on LMP.

3. There is a 5.8 cm maximal diameter posterior intramural uterine mass 

presumably fibroid. Recommend follow-up at clinically appropriate 

interval.

 

[facility]

## 2019-10-08 ENCOUNTER — HOSPITAL ENCOUNTER (OUTPATIENT)
Dept: HOSPITAL 76 - LAB.R | Age: 29
Discharge: HOME | End: 2019-10-08
Attending: ADVANCED PRACTICE MIDWIFE
Payer: COMMERCIAL

## 2019-10-08 DIAGNOSIS — Z36.89: Primary | ICD-10-CM

## 2019-10-08 LAB
AMPHET UR QL SCN: NEGATIVE
BENZODIAZ UR QL SCN: NEGATIVE
CLARITY UR REFRACT.AUTO: CLEAR
COCAINE UR-SCNC: NEGATIVE UMOL/L
GLUCOSE UR QL STRIP.AUTO: NEGATIVE MG/DL
KETONES UR QL STRIP.AUTO: NEGATIVE MG/DL
METHADONE UR QL SCN: NEGATIVE
METHAMPHET UR QL SCN: NEGATIVE
MUCOUS THREADS URNS QL MICRO: (no result)
NITRITE UR QL STRIP.AUTO: NEGATIVE
OPIATES UR QL SCN: NEGATIVE
PH UR STRIP.AUTO: 7 PH (ref 5–7.5)
PROT UR STRIP.AUTO-MCNC: NEGATIVE MG/DL
RBC # UR STRIP.AUTO: NEGATIVE /UL
SP GR UR STRIP.AUTO: 1.02 (ref 1–1.03)
SQUAMOUS URNS QL MICRO: (no result)
T VAGINALIS RRNA GENITAL QL PROBE: NEGATIVE
UROBILINOGEN UR QL STRIP.AUTO: (no result) E.U./DL
UROBILINOGEN UR STRIP.AUTO-MCNC: NEGATIVE MG/DL
VOLATILE DRUGS POS SERPL SCN: (no result)

## 2019-10-08 PROCEDURE — 81001 URINALYSIS AUTO W/SCOPE: CPT

## 2019-10-08 PROCEDURE — 87661 TRICHOMONAS VAGINALIS AMPLIF: CPT

## 2019-10-08 PROCEDURE — 87591 N.GONORRHOEAE DNA AMP PROB: CPT

## 2019-10-08 PROCEDURE — 87491 CHLMYD TRACH DNA AMP PROBE: CPT

## 2019-10-08 PROCEDURE — 80306 DRUG TEST PRSMV INSTRMNT: CPT

## 2019-10-08 PROCEDURE — 87086 URINE CULTURE/COLONY COUNT: CPT

## 2019-10-24 ENCOUNTER — HOSPITAL ENCOUNTER (OUTPATIENT)
Dept: HOSPITAL 76 - LAB | Age: 29
Discharge: HOME | End: 2019-10-24
Attending: ADVANCED PRACTICE MIDWIFE
Payer: COMMERCIAL

## 2019-10-24 DIAGNOSIS — Z36.89: Primary | ICD-10-CM

## 2019-10-24 LAB
BASOPHILS NFR BLD AUTO: 0.1 10^3/UL (ref 0–0.1)
BASOPHILS NFR BLD AUTO: 0.5 %
EOSINOPHIL # BLD AUTO: 0.2 10^3/UL (ref 0–0.7)
EOSINOPHIL NFR BLD AUTO: 1.5 %
ERYTHROCYTE [DISTWIDTH] IN BLOOD BY AUTOMATED COUNT: 13.4 % (ref 12–15)
HGB UR QL STRIP: 13 G/DL (ref 12–16)
LYMPHOCYTES # SPEC AUTO: 2 10^3/UL (ref 1.5–3.5)
LYMPHOCYTES NFR BLD AUTO: 20.8 %
MCH RBC QN AUTO: 31.9 PG (ref 27–31)
MCHC RBC AUTO-ENTMCNC: 34.6 G/DL (ref 32–36)
MCV RBC AUTO: 92.4 FL (ref 81–99)
MONOCYTES # BLD AUTO: 0.5 10^3/UL (ref 0–1)
MONOCYTES NFR BLD AUTO: 5.3 %
NEUTROPHILS # BLD AUTO: 6.9 10^3/UL (ref 1.5–6.6)
NEUTROPHILS # SNV AUTO: 9.7 X10^3/UL (ref 4.8–10.8)
NEUTROPHILS NFR BLD AUTO: 71.3 %
PDW BLD AUTO: 9.2 FL (ref 7.9–10.8)
PLATELET # BLD: 271 10^3/UL (ref 130–450)
RBC MAR: 4.07 10^6/UL (ref 4.2–5.4)

## 2019-10-24 PROCEDURE — 81599 UNLISTED MAAA: CPT

## 2019-10-24 PROCEDURE — 86850 RBC ANTIBODY SCREEN: CPT

## 2019-10-24 PROCEDURE — 86762 RUBELLA ANTIBODY: CPT

## 2019-10-24 PROCEDURE — 86900 BLOOD TYPING SEROLOGIC ABO: CPT

## 2019-10-24 PROCEDURE — 87389 HIV-1 AG W/HIV-1&-2 AB AG IA: CPT

## 2019-10-24 PROCEDURE — 86592 SYPHILIS TEST NON-TREP QUAL: CPT

## 2019-10-24 PROCEDURE — 36415 COLL VENOUS BLD VENIPUNCTURE: CPT

## 2019-10-24 PROCEDURE — 85025 COMPLETE CBC W/AUTO DIFF WBC: CPT

## 2019-10-24 PROCEDURE — 86901 BLOOD TYPING SEROLOGIC RH(D): CPT

## 2019-10-24 PROCEDURE — 87340 HEPATITIS B SURFACE AG IA: CPT

## 2019-10-24 PROCEDURE — 86803 HEPATITIS C AB TEST: CPT

## 2019-11-21 ENCOUNTER — HOSPITAL ENCOUNTER (OUTPATIENT)
Dept: HOSPITAL 76 - LAB.R | Age: 29
Discharge: HOME | End: 2019-11-21
Attending: OBSTETRICS & GYNECOLOGY
Payer: COMMERCIAL

## 2019-11-21 DIAGNOSIS — R30.0: Primary | ICD-10-CM

## 2019-11-21 PROCEDURE — 87086 URINE CULTURE/COLONY COUNT: CPT

## 2019-11-29 ENCOUNTER — HOSPITAL ENCOUNTER (OUTPATIENT)
Dept: HOSPITAL 76 - DI | Age: 29
Discharge: HOME | End: 2019-11-29
Attending: ADVANCED PRACTICE MIDWIFE
Payer: COMMERCIAL

## 2019-11-29 DIAGNOSIS — Z34.90: Primary | ICD-10-CM

## 2019-11-29 DIAGNOSIS — Z36.89: ICD-10-CM

## 2019-11-29 PROCEDURE — 76811 OB US DETAILED SNGL FETUS: CPT

## 2019-11-29 NOTE — ULTRASOUND REPORT
Reason:  SUPERVISION OF NORMAL PREGNANCY

Procedure Date:  11/29/2019   

Accession Number:  [id number] / [id number]                    

Procedure:  US  - OB Detailed Fetal Eval CPT Code:  

 

***Final Report***

 

 

FULL RESULT:

 

 

EXAM:

COMPLETE OBSTETRICAL ULTRASOUND

 

EXAM DATE: 11/29/2019 09:28 AM.

 

CLINICAL HISTORY: Fetal anatomic survey.

 

COMPARISON: OB DETAILED FETAL EVAL 04/16/2018 12:55 PM

OB FIRST TRIMESTER 10/04/2019 9:44 AM.

 

TECHNIQUE: Real-time sonographic evaluation of the fetus performed by the 

sonographer. Multiple representative static images were saved for review.

 

DATING:

Established EGA 19 weeks, 3 days with ANKUSH 04/21/2020 based on LMP of 

07/16/2019.

EGA 19 weeks, 5 days with ANKUSH 04/19/2020 based on the current ultrasound.

 

GENERAL EVALUATION

Chin pregnancy.

Cardiac activity: 154 bpm.

Fetal movement: Visualized.

Presentation: Cephalic.

Placenta: Anterior position. No evidence for previa.

Umbilical cord: 3 vessel cord. Central placental cord origin.

Amniotic fluid: Subjectively normal. MVP 5.8 cm. MARCELLA 16.1 cm.

 

FETAL BIOMETRY

Bi-Parietal Diameter (BPD): 4.9 cm, 21 weeks

Head Circumference (HC):   17.8 cm, 20 weeks 2 days

Abdominal Circumference (AC): 14.4 cm, 19 weeks 5 days

Femur Length (FL): 2.9 cm, 18 weeks 6 days

 

Estimated Fetal Weight: 295 g, 48th percentile for 19 weeks, 3 days.

 

FETAL ANATOMY

The intracranial structures, profile, face/nose/lips, spine, 4 chamber 

heart and outflow tracts, stomach, abdominal wall and cord insertion, 

diaphragm, kidneys, bladder, and extremities were visualized and 

demonstrate no abnormality.

 

MATERNAL STRUCTURES

Uterus: Unremarkable.

Cervix: Long and closed. Transabdominal length 3.8 cm.

Ovaries were not seen, presumably obscured by overlying bowel gas. No 

adnexal abnormality evident.

Free fluid: None.

IMPRESSION:

1. Chin live intrauterine pregnancy with gestational age 19 weeks, 3 

days based on LMP.

2. Estimated fetal weight is within expected limits for assigned dating.

3. Normal fetal anatomic survey.  No fetal anatomic abnormalities are 

detected at this time.

4. Previously noted maternal uterine fibroid was not seen today.

 

RADIA

## 2019-12-03 ENCOUNTER — HOSPITAL ENCOUNTER (OUTPATIENT)
Dept: HOSPITAL 76 - LAB.R | Age: 29
Discharge: HOME | End: 2019-12-03
Attending: ADVANCED PRACTICE MIDWIFE
Payer: COMMERCIAL

## 2019-12-03 DIAGNOSIS — N39.0: Primary | ICD-10-CM

## 2019-12-03 PROCEDURE — 87086 URINE CULTURE/COLONY COUNT: CPT

## 2020-01-13 ENCOUNTER — HOSPITAL ENCOUNTER (OUTPATIENT)
Dept: HOSPITAL 76 - LAB | Age: 30
Discharge: HOME | End: 2020-01-13
Attending: ADVANCED PRACTICE MIDWIFE
Payer: COMMERCIAL

## 2020-01-13 DIAGNOSIS — Z36.89: Primary | ICD-10-CM

## 2020-01-13 LAB
ERYTHROCYTE [DISTWIDTH] IN BLOOD BY AUTOMATED COUNT: 13.3 % (ref 12–15)
HGB UR QL STRIP: 12.4 G/DL (ref 12–16)
MCH RBC QN AUTO: 33.3 PG (ref 27–31)
MCHC RBC AUTO-ENTMCNC: 34.1 G/DL (ref 32–36)
MCV RBC AUTO: 97.8 FL (ref 81–99)
NEUTROPHILS # SNV AUTO: 8.9 X10^3/UL (ref 4.8–10.8)
PDW BLD AUTO: 9.3 FL (ref 7.9–10.8)
PLATELET # BLD: 206 10^3/UL (ref 130–450)
RBC MAR: 3.72 10^6/UL (ref 4.2–5.4)

## 2020-01-13 PROCEDURE — 85027 COMPLETE CBC AUTOMATED: CPT

## 2020-01-13 PROCEDURE — 36415 COLL VENOUS BLD VENIPUNCTURE: CPT

## 2020-01-13 PROCEDURE — 82950 GLUCOSE TEST: CPT

## 2020-01-13 PROCEDURE — 86850 RBC ANTIBODY SCREEN: CPT

## 2020-03-27 ENCOUNTER — HOSPITAL ENCOUNTER (OUTPATIENT)
Dept: HOSPITAL 76 - LAB.R | Age: 30
Discharge: HOME | End: 2020-03-27
Attending: ADVANCED PRACTICE MIDWIFE
Payer: COMMERCIAL

## 2020-03-27 DIAGNOSIS — Z11.3: Primary | ICD-10-CM

## 2020-03-27 LAB — T VAGINALIS RRNA GENITAL QL PROBE: NEGATIVE

## 2020-03-27 PROCEDURE — 87491 CHLMYD TRACH DNA AMP PROBE: CPT

## 2020-03-27 PROCEDURE — 87661 TRICHOMONAS VAGINALIS AMPLIF: CPT

## 2020-03-27 PROCEDURE — 87591 N.GONORRHOEAE DNA AMP PROB: CPT

## 2020-04-27 ENCOUNTER — HOSPITAL ENCOUNTER (INPATIENT)
Dept: HOSPITAL 76 - WFO | Age: 30
LOS: 2 days | Discharge: HOME | End: 2020-04-29
Attending: ADVANCED PRACTICE MIDWIFE | Admitting: ADVANCED PRACTICE MIDWIFE
Payer: COMMERCIAL

## 2020-04-27 DIAGNOSIS — O48.0: Primary | ICD-10-CM

## 2020-04-27 DIAGNOSIS — Z3A.40: ICD-10-CM

## 2020-04-27 LAB
BASOPHILS NFR BLD AUTO: 0.1 10^3/UL (ref 0–0.1)
BASOPHILS NFR BLD AUTO: 0.5 %
EOSINOPHIL # BLD AUTO: 0.2 10^3/UL (ref 0–0.7)
EOSINOPHIL NFR BLD AUTO: 1.1 %
ERYTHROCYTE [DISTWIDTH] IN BLOOD BY AUTOMATED COUNT: 14.1 % (ref 12–15)
HGB UR QL STRIP: 13.5 G/DL (ref 12–16)
LYMPHOCYTES # SPEC AUTO: 2 10^3/UL (ref 1.5–3.5)
LYMPHOCYTES NFR BLD AUTO: 15.4 %
MCH RBC QN AUTO: 32.5 PG (ref 27–31)
MCHC RBC AUTO-ENTMCNC: 34.6 G/DL (ref 32–36)
MCV RBC AUTO: 93.8 FL (ref 81–99)
MONOCYTES # BLD AUTO: 0.8 10^3/UL (ref 0–1)
MONOCYTES NFR BLD AUTO: 6.4 %
NEUTROPHILS # BLD AUTO: 9.8 10^3/UL (ref 1.5–6.6)
NEUTROPHILS # SNV AUTO: 13.1 X10^3/UL (ref 4.8–10.8)
NEUTROPHILS NFR BLD AUTO: 74.9 %
PDW BLD AUTO: 9.6 FL (ref 7.9–10.8)
PLATELET # BLD: 235 10^3/UL (ref 130–450)
RBC MAR: 4.16 10^6/UL (ref 4.2–5.4)

## 2020-04-27 PROCEDURE — 0KQM0ZZ REPAIR PERINEUM MUSCLE, OPEN APPROACH: ICD-10-PCS | Performed by: OBSTETRICS & GYNECOLOGY

## 2020-04-27 PROCEDURE — 85025 COMPLETE CBC W/AUTO DIFF WBC: CPT

## 2020-04-27 RX ADMIN — LIDOCAINE HYDROCHLORIDE PRN ML: 10 INJECTION, SOLUTION EPIDURAL; INFILTRATION; INTRACAUDAL; PERINEURAL at 21:33

## 2020-04-27 RX ADMIN — Medication PRN MLS/HR: at 23:55

## 2020-04-27 RX ADMIN — LIDOCAINE HYDROCHLORIDE PRN ML: 10 INJECTION, SOLUTION EPIDURAL; INFILTRATION; INTRACAUDAL; PERINEURAL at 22:24

## 2020-04-27 RX ADMIN — Medication PRN MLS/HR: at 21:28

## 2020-04-27 NOTE — HISTORY & PHYSICAL EXAMINATION
Prenatal Admit History





- Visit Reason


Visit Reason: Other (pre-induction cervical ripening)





- Pregnancy


: 2


Parity: 1


Premature: 0


Ectopic: 0


: 0


Prenatal Care: positive: [facility]


Prenatal Risk/History: positive: None


Pregnancy Complications This Pregnancy: positive: Treated for GBS/UTI, Other 

(Chronic UTIs; GBS carrier)


Smoking Status: Never smoker





- Mother's Labs


Mother's Blood Type: positive: O


Mother's RH: positive: Positive


GBS: positive: Group B Strep Positive


Rubella Status: positive: Immune





- Other Maternal History


Other Maternal History: 


Lisset is a 30yo  who presents at 40.6wks gestation for admission to OBS 

status for pre-induction cervical ripening.


She has had a normal pregnancy course, complicated only by chronic UTIs and is a

GBS carrier. 





Dating: Initial U/S: LMP c/w 11.4wk U/S for ANKUSH of 2020





OB Hx:


G1:  after epis for viable female infant (linda) 2018. 40wks.


G2: Current





Allergies:  


No Known Allergies





Medications:  


CEPHALEXIN 250 MG ORAL CAPSULE (CEPHALEXIN) 1 tablet po daily; Route: ORAL


CLASSIC PRENATAL TABLET (PRENATAL VIT-FE FUMARATE-FA TABS) 


Past Medical History:


   None





Past Surgical History:


   Adnoids/ear tubes ()





Family History:


Father- Hypertension, kidney disease, high cholesterol


MGF- Diabetes





Social History:


Non contributory





OB Labs and Immunizations:


O pos/Rubella immune


Gentic testing: declined


FAS WNL. Anterior placenta, no previa. 3VC. Size c/w dating. Previously 

visualized uterine fibroid not seen


Influenza 2019


Glucola 119


TDAP 2020


GBS positive UTI in first trimester - IPAP in labor


HSV: denies








SVE performed on admit: 1.5/80/-2/soft/posterior


FHTs: 145bpm baseline. Moderate variability. Accels. No decels.


Ctx: Intermittent and mild





Assessment:


Lisset is a 28 yo  who presents at 40.6wks gestation for pre-induction 

cervical ripening for a post dates pregnancy.


Cat I FHTs


Contractions: non-contributory


Cervical exam: require ripening





Plan:


Cervical ripening with misoprostol q4h


IPAP when active labor or SROM/AROM for GBS


Continuous fetal monitoring


Eat/Drink/Activity to tolerance


Admit to floor with SROM/AROM. active labor, or pit administration


Pain management PRN including whirlpool tub, IV fentanyl, or epidural. Patient 

desires NCB.


Anticipate 








Meds/Allgy





- Home Medications


Home Medications: 


                                Ambulatory Orders











 Medication  Instructions  Recorded  Confirmed


 


Docusate Sodium [Dulcolax Stool 100 mg PO BID PRN #1 capsule 18 





Softener]   


 


Ibuprofen [Motrin] 600 mg PO Q6H  tablet 18 


 


Cephalexin [Keflex] 500 mg PO Q6H 7 Days  capsule 09/15/18 














- Allergies


Allergies/Adverse Reactions: 


                                    Allergies











Allergy/AdvReac Type Severity Reaction Status Date / Time


 


No Known Drug Allergies Allergy   Verified 09/15/18 18:12














Review of Systems





- Constitutional


Constitutional: denies: Fatigue, Fever, Chills, Poor appetite





- Eyes


Eyes: denies: Pain, Blurred vision, Spots in vision





- Ears, Nose & Throat


Ears, Nose & Throat: denies: Vertigo, Nasal congestion, Sore throat





- Cardiovascular


Cariovascular: denies: Irregular heart rate, Palpitations, Chest pain, Syncope





- Respiratory


Respiratory: denies: Cough, Sputum production, SOB at rest, SOB with exertion





- Gastrointestinal


Gastrointestinal: denies: Abdominal pain, Constipation, Nausea, Vomiting





- Genitourinary


Genitourinary: denies: Dysuria, Frequency, Urgency





- Musculoskeletal


Musculoskeletal: denies: Muscle pain, Back pain





- Integumentary


Integumentary: denies: Rash, Lesions





- Neurological


Neurological: denies: General weakness, Headache





- Psychiatric


Psychiatric: denies: Depression, Anxiety





- Endocrine


Endocrine: denies: Polyuria





- All Other Systems


All Other Systems: denies: Reviewed and negative





Physical





- Abdominal Exam


Vital Signs: 





                                        











Temp Pulse Resp BP Pulse Ox


 


 36.5 C   92   18   122/75   100 


 


 20 06:40  20 06:40  20 06:40  20 06:40  20 06:40











Contraction Frequency (min/apart): intermittent


Contraction Intensity: positive: Mild





- Fetal Monitoring


Fetal Heart Rate Baseline: 145


Fetal Strip Review: positive: Category I





- Presentation


Presentation: positive: Vertex





- Vaginal Exam


Membranes: positive: Membranes intact





Plan for Labor





- Plan For Labor


I expect patient to be DC'd or transferred within 96 hours.: Yes


Plan for Labor: 





pre-induction cervical ripening until AROM/SROM, pitocin admin, or active labor.

## 2020-04-27 NOTE — DELIVERY NOTE
Delivery Note





- Delivery Comments (Free Text/Narrative)


Delivery Comments (Free Text/Narrative): 


Birth Note:


Labor: This 29 year old, , @40.6 wks gestation by *.week Ultrasound, 

confirmed by LMP, presented @ 0700 for pre-induction cervical ripening. Cervix 

was 1.5/80/-2 and vertex. FHR pattern demonstrated 145 baseline in a Category I 

pattern. Normal labor course. Pain management with breathing and position 

changes. SROM occurred @  and was noted to be moderate and clear.


Birth: Normal  of a male infant on 2020 @ . Nuchal not present.  

The  was placed on maternal abdomen, stimulated, dried and placed skin to

skin. Apgars 8 at one minute and 9 at five minutes. The umbilical cord was 

allowed to stop pulsating at which time it was doubly clamped by CNM and cut by 

FOB. Pitocin administered via IV for hemostasis. Fundal massage and gentle cord 

traction applied for active third stage management. Cord blood was obtained. 

Placenta delivered spontaneously and intact at . Three vessel cord. EBL 

350mL.





Fourth Stage:  Uterine fundus firm and without excessive bleeding. The perineum,

vagina, and cervix were inspected and found to have a left sided second degree 

labial tear. Tissue was repaired using 3% lidocaine in the usual sterile fashion

using 3.0 vicryl for deep closure and 4.0 vicryl for subcuticular layer. Dr Thomas

was consulted for completion of repair.  Vaginal and rectal examination 

following repair was done. Tissues well approximated. Breastfeeding initiated. 

Family bonding well. Both mother and baby are in stable condition.

## 2020-04-27 NOTE — PROVIDER PROGRESS NOTE
Labor Progress Note





- Uterine Monitoring


Uterine Monitoring Mode: positive: External toco


Contraction Frequency (min/apart): 2-3


Contraction Intensity: positive: Moderate to strong


Uterine Resting Tone: positive: Soft





- Fetal Monitoring


Fetal Monitor Mode: positive: External ultrasound


Fetal Heart Rate Baseline: 135


Fetal Heart Rate Variability: positive: Moderate (6-25 bmp)


Fetal Accelerations: positive: Present, 15x15


Fetal Decelerations: positive: Early





- Vaginal Exam


Dilation (in cm): 8


Effacement (%): 90


Station: -1


Cervical Position: Midposition (per RN sve)





- Labor Progress Note


Labor Progress Note/Additional Text: 


S: santiago is coping well with ctx. Reports feeling mild pressure with 

contractions that resolves between. No urge to push at this time.





O: FHTs as above


CTX as above


Afebrile


Bag of gomez intact. 


Ampicillin running for GBS prophylaxis





A: Nearing second stage of labor. Awaiting spontaneous urge to push. 


FHTs Cat I


Uterine ctx adequate





P:


Continue with continuous monitoring


Await pt prompting or 3 hours for cervical exam/pushing


Activity as tolerated


Encourage PO fluid intake


Pain management continues with breathing and position changes.


Expect

## 2020-04-28 RX ADMIN — IBUPROFEN SCH MG: 600 TABLET, FILM COATED ORAL at 00:34

## 2020-04-28 RX ADMIN — ACETAMINOPHEN PRN MG: 500 TABLET ORAL at 20:59

## 2020-04-28 RX ADMIN — DOCUSATE SODIUM SCH MG: 100 CAPSULE, LIQUID FILLED ORAL at 08:32

## 2020-04-28 RX ADMIN — IBUPROFEN SCH MG: 600 TABLET, FILM COATED ORAL at 06:30

## 2020-04-28 RX ADMIN — ACETAMINOPHEN PRN MG: 500 TABLET ORAL at 13:00

## 2020-04-28 RX ADMIN — IBUPROFEN SCH MG: 600 TABLET, FILM COATED ORAL at 13:01

## 2020-04-28 RX ADMIN — IBUPROFEN SCH MG: 600 TABLET, FILM COATED ORAL at 19:12

## 2020-04-28 RX ADMIN — ACETAMINOPHEN PRN MG: 500 TABLET ORAL at 04:40

## 2020-04-29 VITALS — DIASTOLIC BLOOD PRESSURE: 73 MMHG | SYSTOLIC BLOOD PRESSURE: 122 MMHG

## 2020-04-29 RX ADMIN — IBUPROFEN SCH MG: 600 TABLET, FILM COATED ORAL at 11:04

## 2020-04-29 RX ADMIN — IBUPROFEN SCH MG: 600 TABLET, FILM COATED ORAL at 05:31

## 2020-04-29 RX ADMIN — DOCUSATE SODIUM SCH MG: 100 CAPSULE, LIQUID FILLED ORAL at 08:23

## 2020-04-29 NOTE — LABOR FLOWSHEET
===================================

Labor Flowsheet

===================================

Datetime Report Generated by CPN: 2020 14:24

   

   

===========================

Datetime: 2020 07:55

===========================

   

   

===================================

VITAL SIGNS

===================================

   

 NBP Sys/Claudette/Mean (mmHg):  122

:  73

:  85

 Pulse:  81

 SpO2 (%):  100

   

===========================

Datetime: 2020 22:00

===========================

   

 Respirations:  20

 Pain Presence:  Intermittent

 Pain Type:  Cramping

 Pain Location:  Abdomen

 Pain Relief Measures:  Comfort Measures

   

===========================

Datetime: 2020 21:45

===========================

   

 Temperature (C):  36.9

 Temperature Route:  Oral

   

===========================

Datetime: 2020 21:12

===========================

   

Stage of Pregnancy:  Recovery

   

===================================

UTERINE ACTIVITY

===================================

   

 Monitor Mode:  External

 Frequency (min):  1-2

 Quality:  Strong

 Duration (sec):  45-60

 Pattern:  Normal: <= 5 Contractions in 10 Minutes

 Resting Tone (Palpate):  Relaxed

 Actions for Fetal Decelerations:  Hands and Knees; Provider Notified

 Comments:  intermittent fetal tracing due to patient positioning, FHR 110s-130s 

   

===================================

STAGE 2

===================================

   

 Pushing:  Urge to Push; Involuntary Pushing

 Pushing Position:  Pushing with Contractions

 Pushing Progress:  Perineal Bulging; Presenting Part Visible;  with Pushing; Pushing Effecti
vely with Contractions

   

===========================

Datetime: 2020 21:00

===========================

   

   

===================================

FETAL ASSESSMENT A

===================================

   

 Monitor Mode:  Telemetry

 FHR Baseline Rate :  140

 Variability:  Moderate 6-25 bpm

 Accelerations:  None

 Decelerations:  None

 Category:  Category I

 Oxygen Method:  Room Air

 Patient Position/Activity:  Hands-Knees

   

===================================

COMMUNICATION

===================================

   

 Communication:  Provider at Bedside

 Provider Notified (Name):  Eden RODRIGUEZ

 Communication Comments:  head visible on perineum, provider to bedside

   

===========================

Datetime: 2020 20:57

===========================

   

 Pain Coping:  Crying

 Membrane Status:  Ruptured

 Membranes Rupture Method:  Spontaneous

 Amniotic Fluid Color:  Clear

 Amniotic Fluid Amount:  Small

 Amniotic Fluid Odor:  Normal

 I/O Interventions:  Up to BR

   

===========================

Datetime: 2020 20:35

===========================

   

 LaborFlag:  Labor

   

===========================

Datetime: 2020 20:29

===========================

   

   

===================================

VAGINAL EXAM

===================================

   

 Dilatation (cm):  8.0

 Effacement (%):  90

 Station:  0

 Exam by:  Eden Savage CNM

 Cervix, Consistency:  Soft

   

===========================

Datetime: 2020 20:05

===========================

   

   

===================================

PAIN

===================================

   

 Pain Scale:  8

   

===================================

PATIENT CARE

===================================

   

 IV/Blood Work:  IV Saline Locked

 Comfort Measures:  Breathing/Relaxation; Coaching; Back Rub Given; Family Support

   

===========================

Datetime: 2020 20:01

===========================

   

 Provider Reviewed Strip:  No

 Notification Reason:  Status Update

   

===========================

Datetime: 2020 19:00

===========================

   

 Contraction Comments:  tracinglost d/t toco needing adjustment

   

===========================

Datetime: 2020 18:48

===========================

   

 Monitor Interventions for UA:  Roslyn Adjusted

   

===========================

Datetime: 2020 18:42

===========================

   

   

===================================

MEDICATIONS

===================================

   

 Antibiotics:  Ampicillin IV 2 Gm

   

===========================

Datetime: 2020 18:35

===========================

   

 Monitor Interventions for FHR:  Ultrasound Adjusted

   

===========================

Datetime: 2020 18:33

===========================

   

 Cervix, Position:  Anterior

   

===========================

Datetime: 2020 17:30

===========================

   

 FHR Baseline Changes:  Return to Previous Baseline

   

===========================

Datetime: 2020 17:18

===========================

   

 Pain Assessment Comments:  Pt tolerating contractions well. 

   

===========================

Datetime: 2020 16:15

===========================

   

 Strip Reviewed by:  Eden CNM

   

===========================

Datetime: 2020 14:41

===========================

   

 Cervical Ripening Agents:  Cytotec @ 50

   

===========================

Datetime: 2020 07:42

===========================

   

 Vaginal Exam Comments:  vertix will start Miso

## 2020-04-29 NOTE — DISCHARGE SUMMARY
Discharge Summary


Admit Date: 20


Discharge Date: 20


Discharging Provider: Eden Savage CNM


Code Status: Attempt Resuscitation


Condition at Discharge: Good


Discharge Disposition: 01 Home, Self Care





- DIAGNOSES


Admission Diagnoses: 


Pregnancy





Discharge Diagnoses with Status of Each Condition: 


 Vaginal Delivery








- HPI


History of Present Illness: 


Date of Admission 2020


Date of Discharge 2020





Diagnosis on Admission:


   1. A 28yo  at 40.6 week intrauterine pregnancy


   2. Pre-Induction Cervical Ripening- IOL


   3. GBS Positive


   


Diagnosis on Discharge


   1. A 28yo  s/p spontaneous vaginal delivery on 2020


   2. Normal recovery


   3. Inadequate GBS prophylaxis r/t rapid labor


   


Brief History:


She is a patient at [facility] who presented on 2020 for 

IOL. TCervix was 1.5/80/-2 and vertex. MISO for cervical ripening. Spontaneous 

active labor followed. FHR pattern demonstrated 145 baseline in a Category I 

pattern. Normal labor course. Pain management with breathing and position 

changes.  She spontaneously delivered a viable male infant named Slava. Apgars 

were 8 and 9- and 1 and 5 minutes respectively. EBL 350mL. The patient has a 2nd

degree left labial laceration that was repaired with 3-0 and 4-0 vicryl in usual

fashion under sterile conditions.








She has been doing well in her postpartum course. She is ambulating and 

tolerating a regular diet. She is urinating without difficulty and her lochia is

normal. Her pain is well controlled with oral medications. She will be 

discharged home today on pastpartum day #2 with prescriptions for ibuprofen and 

colace. She intends to follow up with myself at [facility] in 1

and 3 weeks for routine postpartum visit. She has been given precautions to call

if she has any worsening fevers, chills, abdominal pain, increased bleeding or 

foul smelling vaginal lochia.








- ALLERGIES


Allergies/Adverse Reactions: 


                                    Allergies











Allergy/AdvReac Type Severity Reaction Status Date / Time


 


No Known Drug Allergies Allergy   Verified 09/15/18 18:12














- MEDICATIONS


Home Medications: 


                                Ambulatory Orders











 Medication  Instructions  Recorded  Confirmed


 


Docusate Sodium [Dulcolax Stool 100 mg PO BID PRN #1 capsule 18 





Softener]   


 


Ibuprofen [Motrin] 600 mg PO Q6H  tablet 18 


 


Cephalexin [Keflex] 500 mg PO Q6H 7 Days  capsule 09/15/18 


 


Ibuprofen [Motrin] 600 mg PO Q6H #60 tablet 20 














- PHYSICAL EXAM AT DISCHARGE


General Appearance: positive: No acute distress


Eyes Bilateral: positive: Normal inspection


ENT: positive: ENT inspection nml


Neck: positive: Nml inspection





- LABS


Result Diagrams: 


                                 20 08:30








- FOLLOW UP


Follow Up: 


One, three, and six weeks postpartum

## 2020-04-29 NOTE — DISCHARGE PLAN
Discharge Plan


Problem Reviewed?: Yes


Disposition: 01 Home, Self Care


Condition: Good


Prescriptions: 


Ibuprofen [Motrin] 600 mg PO Q6H #60 tablet


Diet: Regular


Activity Restrictions: No Restrictions


Shower Restrictions: No


No Smoking: If you smoke, Please STOP!  Call [phone] for help.

## 2021-09-20 NOTE — PROVIDER PROGRESS NOTE
Subjective





- Prog Note Date


Prog Note Date: 20


Prog Note Time: 17:30





- Subjective


Subjective: 


Lisset is a 28yo  who is on postpartum Day 1 following an  of healthy 

male infant, named Slava


She is breastfeeding well. 


Her Lochia is decreasing.


Her 2nd degree laceration, which was repaired with 3-0 and 4-0 vicryl is healing

well, and pain is well controlled with PO medications.


Fundus is firm.


She is ambulating, and urinating without difficulty.


She will stay tonight and be discharged in the AM, if no complications occur. 








Objective





- Vital Signs/Intake & Output


Reviewed Vital Signs: Yes


Vital Signs: 


                                Vital Signs x48h











  Temp Pulse Resp BP Pulse Ox


 


 20 16:33  36.8 C  66  16  112/70  100











Intake & Output: 


                                 Intake & Output











 20





 23:59 23:59 23:59 23:59


 


Intake Total   1539.783 480


 


Balance   1539.783 480














- Objective


General Appearance: positive: No acute distress


Respiratory: positive: No respiratory distress


Abdomen: positive: Non-tender, No distention


Extremities: positive: Non-tender, Full ROM, Nml appearance





- Lab Results


Fish Bones: 


                                 20 08:30 Yes